# Patient Record
Sex: MALE | Race: WHITE | NOT HISPANIC OR LATINO | URBAN - NONMETROPOLITAN AREA
[De-identification: names, ages, dates, MRNs, and addresses within clinical notes are randomized per-mention and may not be internally consistent; named-entity substitution may affect disease eponyms.]

---

## 2020-09-15 ENCOUNTER — OFFICE VISIT (OUTPATIENT)
Dept: URBAN - NONMETROPOLITAN AREA CLINIC 2 | Facility: CLINIC | Age: 70
End: 2020-09-15
Payer: COMMERCIAL

## 2020-09-15 ENCOUNTER — TELEPHONE ENCOUNTER (OUTPATIENT)
Dept: URBAN - NONMETROPOLITAN AREA CLINIC 2 | Facility: CLINIC | Age: 70
End: 2020-09-15

## 2020-09-15 DIAGNOSIS — D50.9 IRON DEFICIENCY ANEMIA: ICD-10-CM

## 2020-09-15 DIAGNOSIS — I48.91 ATRIAL FIBRILLATION: ICD-10-CM

## 2020-09-15 DIAGNOSIS — K63.5 COLONIC POLYP: ICD-10-CM

## 2020-09-15 DIAGNOSIS — I25.9 CORONARY ARTERY DISEASE: ICD-10-CM

## 2020-09-15 DIAGNOSIS — K57.90 DIVERTICULOSIS: ICD-10-CM

## 2020-09-15 PROCEDURE — 3017F COLORECTAL CA SCREEN DOC REV: CPT | Performed by: INTERNAL MEDICINE

## 2020-09-15 PROCEDURE — 99204 OFFICE O/P NEW MOD 45 MIN: CPT | Performed by: INTERNAL MEDICINE

## 2020-09-15 PROCEDURE — G8427 DOCREV CUR MEDS BY ELIG CLIN: HCPCS | Performed by: INTERNAL MEDICINE

## 2020-09-15 PROCEDURE — 1036F TOBACCO NON-USER: CPT | Performed by: INTERNAL MEDICINE

## 2020-09-15 NOTE — HPI-TODAY'S VISIT:
Mr. Lawson is a 70 year old gentleman with past medical history of CAD s/p stents to proximal and mid LAD (1998), mid-distal LAD (2003), apical thrombus currently on coumadin, paroxysmal atrial fibrillation on coumadin, history of AICD, heart failure with EF of 50-55%, history of colonic polyps, and history of iron deficiency anemia who presents for evaluation of iron deficiency anemia.  Mr. Lawson states he has had a two year history of iron deficiency anemia. He was previously evaluated Dr. Delgado regarding his iron deficiency anemia. As part of the evaluation, Mr. Lawson underwent a colonoscopy which was reportedly normal in 2019. At the time there was discussion about undergoing an upper endoscopy and video capsule endoscopy, however Mr. Lawson did not pursue the upper endoscopy or video capsule endoscopy.   He notes that he has been on iron supplementation. He is currently on coumadin. He is not on non-steroidal anti-inflammatory medications or aspirin. He is not currently on a proton pump inhibitor.   He notes his stool is dark brown. He denies melena or hematochezia. He denies weight loss. He denies reflux ilke symptoms.   He is currently being evaluated at [facility] for his iron deficiency anemia.   Prior Endoscopic Evaluation: 2019: Colonoscopy Sigmoid diverticulosis. No polyps identified.   7/28/2020:  Hemoglobin 11.2, hematocrit 34.0.

## 2020-09-16 LAB
A/G RATIO: 1.4
ALBUMIN: 4.2
ALKALINE PHOSPHATASE: 90
ALT (SGPT): 19
AST (SGOT): 21
BASO (ABSOLUTE): 0.1
BASOS: 1
BILIRUBIN, TOTAL: 0.4
BUN/CREATININE RATIO: 10
BUN: 11
CALCIUM: 8.9
CARBON DIOXIDE, TOTAL: 25
CHLORIDE: 98
CREATININE: 1.1
EGFR IF AFRICN AM: 78
EGFR IF NONAFRICN AM: 67
EOS (ABSOLUTE): 0.2
EOS: 3
FERRITIN, SERUM: 35
GLOBULIN, TOTAL: 3
GLUCOSE: 88
HEMATOCRIT: 35.8
HEMATOLOGY COMMENTS:: (no result)
HEMOGLOBIN: 11.5
IMMATURE CELLS: (no result)
IMMATURE GRANS (ABS): 0
IMMATURE GRANULOCYTES: 1
IRON BIND.CAP.(TIBC): 359
IRON SATURATION: 14
IRON: 49
LYMPHS (ABSOLUTE): 1.5
LYMPHS: 25
MCH: 28.5
MCHC: 32.1
MCV: 89
MONOCYTES(ABSOLUTE): 0.6
MONOCYTES: 10
NEUTROPHILS (ABSOLUTE): 3.6
NEUTROPHILS: 60
NRBC: (no result)
PLATELETS: 258
POTASSIUM: 4.4
PROTEIN, TOTAL: 7.2
RBC: 4.03
RDW: 13.7
SODIUM: 137
UIBC: 310
WBC: 6

## 2020-10-06 ENCOUNTER — TELEPHONE ENCOUNTER (OUTPATIENT)
Dept: URBAN - METROPOLITAN AREA CLINIC 92 | Facility: CLINIC | Age: 70
End: 2020-10-06

## 2020-10-13 ENCOUNTER — OFFICE VISIT (OUTPATIENT)
Dept: URBAN - NONMETROPOLITAN AREA CLINIC 2 | Facility: CLINIC | Age: 70
End: 2020-10-13
Payer: COMMERCIAL

## 2020-10-13 ENCOUNTER — WEB ENCOUNTER (OUTPATIENT)
Dept: URBAN - NONMETROPOLITAN AREA CLINIC 2 | Facility: CLINIC | Age: 70
End: 2020-10-13

## 2020-10-13 DIAGNOSIS — D50.9 IRON DEFICIENCY ANEMIA: ICD-10-CM

## 2020-10-13 DIAGNOSIS — K63.5 COLONIC POLYP: ICD-10-CM

## 2020-10-13 DIAGNOSIS — I25.9 CORONARY ARTERY DISEASE: ICD-10-CM

## 2020-10-13 DIAGNOSIS — I48.91 ATRIAL FIBRILLATION: ICD-10-CM

## 2020-10-13 DIAGNOSIS — K57.90 DIVERTICULOSIS: ICD-10-CM

## 2020-10-13 PROCEDURE — 3017F COLORECTAL CA SCREEN DOC REV: CPT | Performed by: INTERNAL MEDICINE

## 2020-10-13 PROCEDURE — 99214 OFFICE O/P EST MOD 30 MIN: CPT | Performed by: INTERNAL MEDICINE

## 2020-10-13 PROCEDURE — G8427 DOCREV CUR MEDS BY ELIG CLIN: HCPCS | Performed by: INTERNAL MEDICINE

## 2020-10-13 PROCEDURE — 1036F TOBACCO NON-USER: CPT | Performed by: INTERNAL MEDICINE

## 2020-10-13 NOTE — HPI-TODAY'S VISIT:
9/15/2020: Initial Gastroenterology Clinic Visit  Mr. Lawson is a 70 year old gentleman with past medical history of CAD s/p stents to proximal and mid LAD (1998), mid-distal LAD (2003), apical thrombus currently on coumadin, paroxysmal atrial fibrillation on coumadin, history of AICD, heart failure with EF of 50-55%, history of colonic polyps, and history of iron deficiency anemia who presents for evaluation of iron deficiency anemia.  Mr. Lawson states he has had a two year history of iron deficiency anemia. He was previously evaluated by Dr. Delgado regarding his iron deficiency anemia. As part of the evaluation, Mr. Lawson underwent a colonoscopy which showed sigmoid diverticulosis. At the time there was discussion about undergoing an upper endoscopy and video capsule endoscopy, however Mr. Lawson did not pursue the upper endoscopy or video capsule endoscopy.   He is currently on coumadin. He is not on non-steroidal anti-inflammatory medications or aspirin. He is not currently on a proton pump inhibitor.   He notes his stool is dark brown. He denies melena or hematochezia. He denies weight loss. He denies reflux ilke symptoms.   He is currently being evaluated at [facility] for his iron deficiency anemia.   Prior Endoscopic Evaluation: 2019: Colonoscopy Sigmoid diverticulosis. No polyps identified.   Prior Laboratory Evaluation:  7/28/2020:  Hemoglobin 11.2, hematocrit 34.0.  Interval Events:  - 9/15/2020: WBC normal, hemoglobin 11.5, hematocrit 35.8, platelets 258. Serum iron 49, TIBC 359, percent transferrin 14%. Chemistry panel and LFTs normal. - After results were relayed to Mr. Lawson, he indicated he wanted to check his repeat blood counts at his next clinic visit before deciding on undergoingan EGD + VCE. - He denies melena or hematochezia. He is not on iron supplementation currently. Denies abdominal pain.

## 2020-10-14 LAB
BASO (ABSOLUTE): 0.1
BASOS: 1
EOS (ABSOLUTE): 0.1
EOS: 2
FERRITIN, SERUM: 23
HEMATOCRIT: 39.2
HEMATOLOGY COMMENTS:: (no result)
HEMOGLOBIN: 12.3
IMMATURE CELLS: (no result)
IMMATURE GRANS (ABS): 0
IMMATURE GRANULOCYTES: 0
IRON BIND.CAP.(TIBC): 359
IRON SATURATION: 12
IRON: 44
LYMPHS (ABSOLUTE): 1.2
LYMPHS: 20
MCH: 27.9
MCHC: 31.4
MCV: 89
MONOCYTES(ABSOLUTE): 0.7
MONOCYTES: 10
NEUTROPHILS (ABSOLUTE): 4.2
NEUTROPHILS: 67
NRBC: (no result)
PLATELETS: 224
RBC: 4.41
RDW: 14.2
UIBC: 315
WBC: 6.3

## 2020-11-12 ENCOUNTER — TELEPHONE ENCOUNTER (OUTPATIENT)
Dept: URBAN - NONMETROPOLITAN AREA CLINIC 2 | Facility: CLINIC | Age: 70
End: 2020-11-12

## 2020-11-24 LAB
BASO (ABSOLUTE): 0.1
BASOS: 1
EOS (ABSOLUTE): 0.1
EOS: 3
FERRITIN, SERUM: 37
HEMATOCRIT: 42.4
HEMATOLOGY COMMENTS:: (no result)
HEMOGLOBIN: 13.6
IMMATURE CELLS: (no result)
IMMATURE GRANS (ABS): 0
IMMATURE GRANULOCYTES: 0
IRON BIND.CAP.(TIBC): 360
IRON SATURATION: 17
IRON: 62
LYMPHS (ABSOLUTE): 1
LYMPHS: 21
MCH: 29.2
MCHC: 32.1
MCV: 91
MONOCYTES(ABSOLUTE): 0.4
MONOCYTES: 8
NEUTROPHILS (ABSOLUTE): 3.1
NEUTROPHILS: 67
NRBC: (no result)
PLATELETS: 194
RBC: 4.66
RDW: 16.2
UIBC: 298
WBC: 4.6

## 2020-12-01 ENCOUNTER — TELEPHONE ENCOUNTER (OUTPATIENT)
Dept: URBAN - METROPOLITAN AREA CLINIC 92 | Facility: CLINIC | Age: 70
End: 2020-12-01

## 2020-12-07 ENCOUNTER — TELEPHONE ENCOUNTER (OUTPATIENT)
Dept: URBAN - METROPOLITAN AREA CLINIC 92 | Facility: CLINIC | Age: 70
End: 2020-12-07

## 2020-12-08 ENCOUNTER — OFFICE VISIT (OUTPATIENT)
Dept: URBAN - NONMETROPOLITAN AREA CLINIC 2 | Facility: CLINIC | Age: 70
End: 2020-12-08

## 2021-01-11 ENCOUNTER — OFFICE VISIT (OUTPATIENT)
Dept: URBAN - NONMETROPOLITAN AREA CLINIC 2 | Facility: CLINIC | Age: 71
End: 2021-01-11

## 2021-02-01 ENCOUNTER — OFFICE VISIT (OUTPATIENT)
Dept: URBAN - NONMETROPOLITAN AREA CLINIC 2 | Facility: CLINIC | Age: 71
End: 2021-02-01
Payer: COMMERCIAL

## 2021-02-01 VITALS
HEART RATE: 93 BPM | SYSTOLIC BLOOD PRESSURE: 101 MMHG | BODY MASS INDEX: 30.8 KG/M2 | WEIGHT: 220 LBS | HEIGHT: 71 IN | DIASTOLIC BLOOD PRESSURE: 69 MMHG | TEMPERATURE: 96.7 F

## 2021-02-01 DIAGNOSIS — K57.30 COLON, DIVERTICULOSIS: ICD-10-CM

## 2021-02-01 DIAGNOSIS — I48.91 ATRIAL FIBRILLATION: ICD-10-CM

## 2021-02-01 DIAGNOSIS — D50.8 OTHER IRON DEFICIENCY ANEMIAS: ICD-10-CM

## 2021-02-01 DIAGNOSIS — K63.5 COLONIC POLYP: ICD-10-CM

## 2021-02-01 DIAGNOSIS — I25.9 CORONARY ARTERY DISEASE: ICD-10-CM

## 2021-02-01 PROCEDURE — 1036F TOBACCO NON-USER: CPT | Performed by: INTERNAL MEDICINE

## 2021-02-01 PROCEDURE — G9903 PT SCRN TBCO ID AS NON USER: HCPCS | Performed by: INTERNAL MEDICINE

## 2021-02-01 PROCEDURE — 99213 OFFICE O/P EST LOW 20 MIN: CPT | Performed by: INTERNAL MEDICINE

## 2021-02-01 PROCEDURE — G8427 DOCREV CUR MEDS BY ELIG CLIN: HCPCS | Performed by: INTERNAL MEDICINE

## 2021-02-01 PROCEDURE — 3017F COLORECTAL CA SCREEN DOC REV: CPT | Performed by: INTERNAL MEDICINE

## 2021-02-01 NOTE — HPI-TODAY'S VISIT:
9/15/2020: Initial Gastroenterology Clinic Visit    Mr. Lawson is a 70 year old gentleman with past medical history of CAD s/p stents to proximal and mid LAD (1998), mid-distal LAD (2003), apical thrombus currently on coumadin, paroxysmal atrial fibrillation on coumadin, history of AICD, heart failure with EF of 50-55%, history of colonic polyps, and history of iron deficiency anemia who presents for evaluation of iron deficiency anemia.  Mr. Lawson states he has had a two year history of iron deficiency anemia. He was previously evaluated by Dr. Delgado regarding his iron deficiency anemia. As part of the evaluation, Mr. Lawson underwent a colonoscopy which showed sigmoid diverticulosis. At the time there was discussion about undergoing an upper endoscopy and video capsule endoscopy, however Mr. Lawson did not pursue the upper endoscopy or video capsule endoscopy.   He is currently on coumadin. He is not on non-steroidal anti-inflammatory medications or aspirin. He is not currently on a proton pump inhibitor.   He notes his stool is dark brown. He denies melena or hematochezia. He denies weight loss. He denies reflux ilke symptoms.   He is currently being evaluated at [facility] for his iron deficiency anemia.   Prior Endoscopic Evaluation: 2019: Colonoscopy Sigmoid diverticulosis. No polyps identified.   Prior Laboratory Evaluation:  7/28/2020:  Hemoglobin 11.2, hematocrit 34.0.  Interval Events:  - 9/15/2020: WBC normal, hemoglobin 11.5, hematocrit 35.8, platelets 258. Serum iron 49, TIBC 359, percent transferrin 14%. Chemistry panel and LFTs normal. - After results were relayed to Mr. Lawson, he indicated he wanted to check his repeat blood counts at his next clinic visit before deciding on undergoingan EGD + VCE.  10/13/2020: WBC 6.3 (differential normal), hemoglobin 12.3, hematocrit 39.2, platelets 224. Serum iron 44, TIBC 359, percent saturation 12%.  11/12/2020: Complete blood count with differential normal. Serum iron 62, TIBC 360, percent saturation 17%.  2/1/2021: Gastroenterology Clinic Follow-Up He is not on iron supplementation currently. Denies abdominal pain. He is interested in pursuing upper endoscopy.

## 2021-02-23 ENCOUNTER — TELEPHONE ENCOUNTER (OUTPATIENT)
Dept: URBAN - METROPOLITAN AREA CLINIC 92 | Facility: CLINIC | Age: 71
End: 2021-02-23

## 2021-03-16 ENCOUNTER — OFFICE VISIT (OUTPATIENT)
Dept: URBAN - METROPOLITAN AREA MEDICAL CENTER 1 | Facility: MEDICAL CENTER | Age: 71
End: 2021-03-16
Payer: COMMERCIAL

## 2021-03-16 DIAGNOSIS — K29.40 ATROPHIC GASTRITIS: ICD-10-CM

## 2021-03-16 DIAGNOSIS — K22.8 COLUMNAR-LINED ESOPHAGUS: ICD-10-CM

## 2021-03-16 PROCEDURE — 43239 EGD BIOPSY SINGLE/MULTIPLE: CPT | Performed by: INTERNAL MEDICINE

## 2021-03-18 ENCOUNTER — TELEPHONE ENCOUNTER (OUTPATIENT)
Dept: URBAN - NONMETROPOLITAN AREA CLINIC 2 | Facility: CLINIC | Age: 71
End: 2021-03-18

## 2021-03-18 RX ORDER — PANTOPRAZOLE SODIUM 40 MG/1
1 TABLET TABLET, DELAYED RELEASE ORAL EVERY 12 HOURS
Qty: 180 TABLET | Refills: 3 | OUTPATIENT
Start: 2021-03-19

## 2021-03-24 ENCOUNTER — TELEPHONE ENCOUNTER (OUTPATIENT)
Dept: URBAN - NONMETROPOLITAN AREA CLINIC 2 | Facility: CLINIC | Age: 71
End: 2021-03-24

## 2021-03-25 ENCOUNTER — TELEPHONE ENCOUNTER (OUTPATIENT)
Dept: URBAN - NONMETROPOLITAN AREA CLINIC 2 | Facility: CLINIC | Age: 71
End: 2021-03-25

## 2021-03-30 ENCOUNTER — OFFICE VISIT (OUTPATIENT)
Dept: URBAN - NONMETROPOLITAN AREA CLINIC 2 | Facility: CLINIC | Age: 71
End: 2021-03-30

## 2021-04-07 ENCOUNTER — TELEPHONE ENCOUNTER (OUTPATIENT)
Dept: URBAN - NONMETROPOLITAN AREA CLINIC 2 | Facility: CLINIC | Age: 71
End: 2021-04-07

## 2021-05-18 ENCOUNTER — TELEPHONE ENCOUNTER (OUTPATIENT)
Dept: URBAN - NONMETROPOLITAN AREA CLINIC 2 | Facility: CLINIC | Age: 71
End: 2021-05-18

## 2021-05-18 ENCOUNTER — OFFICE VISIT (OUTPATIENT)
Dept: URBAN - METROPOLITAN AREA MEDICAL CENTER 1 | Facility: MEDICAL CENTER | Age: 71
End: 2021-05-18
Payer: COMMERCIAL

## 2021-05-18 DIAGNOSIS — K29.40 ATROPHIC GASTRITIS: ICD-10-CM

## 2021-05-18 DIAGNOSIS — K22.8 COLUMNAR-LINED ESOPHAGUS: ICD-10-CM

## 2021-05-18 PROCEDURE — 43239 EGD BIOPSY SINGLE/MULTIPLE: CPT | Performed by: INTERNAL MEDICINE

## 2021-05-18 RX ORDER — PANTOPRAZOLE SODIUM 40 MG/1
1 TABLET TABLET, DELAYED RELEASE ORAL EVERY 12 HOURS
Qty: 180 TABLET | Refills: 3 | Status: ACTIVE | COMMUNITY
Start: 2021-03-19

## 2021-06-11 ENCOUNTER — TELEPHONE ENCOUNTER (OUTPATIENT)
Dept: URBAN - NONMETROPOLITAN AREA CLINIC 2 | Facility: CLINIC | Age: 71
End: 2021-06-11

## 2021-06-14 PROBLEM — 55300003: Status: ACTIVE | Noted: 2021-06-14

## 2021-06-17 LAB
A/G RATIO: 1.4
ALBUMIN: 4.3
ALKALINE PHOSPHATASE: 80
ALT (SGPT): 29
AST (SGOT): 23
BASO (ABSOLUTE): 0.1
BASOS: 1
BILIRUBIN, TOTAL: 0.5
BUN/CREATININE RATIO: 16
BUN: 20
CALCIUM: 9.2
CARBON DIOXIDE, TOTAL: 27
CHLORIDE: 100
CREATINE KINASE,TOTAL: 144
CREATININE: 1.23
EGFR IF AFRICN AM: 68
EGFR IF NONAFRICN AM: 59
EOS (ABSOLUTE): 0.2
EOS: 3
GLOBULIN, TOTAL: 3
GLUCOSE: 94
HEMATOCRIT: 43.7
HEMATOLOGY COMMENTS:: (no result)
HEMOGLOBIN: 14.8
IMMATURE CELLS: (no result)
IMMATURE GRANS (ABS): 0
IMMATURE GRANULOCYTES: 1
LYMPHS (ABSOLUTE): 1.2
LYMPHS: 20
MCH: 31.6
MCHC: 33.9
MCV: 93
MONOCYTES(ABSOLUTE): 0.7
MONOCYTES: 12
NEUTROPHILS (ABSOLUTE): 3.7
NEUTROPHILS: 63
NRBC: (no result)
PLATELETS: 192
POTASSIUM: 4.4
PROTEIN, TOTAL: 7.3
RBC: 4.68
RDW: 13.5
SODIUM: 138
WBC: 5.9

## 2021-06-21 ENCOUNTER — OFFICE VISIT (OUTPATIENT)
Dept: URBAN - NONMETROPOLITAN AREA CLINIC 2 | Facility: CLINIC | Age: 71
End: 2021-06-21
Payer: COMMERCIAL

## 2021-06-21 VITALS — HEART RATE: 77 BPM | HEIGHT: 71 IN | DIASTOLIC BLOOD PRESSURE: 79 MMHG | SYSTOLIC BLOOD PRESSURE: 119 MMHG

## 2021-06-21 DIAGNOSIS — Z86.010 PERSONAL HISTORY OF COLONIC POLYPS: ICD-10-CM

## 2021-06-21 DIAGNOSIS — D51.0 PERNICIOUS ANEMIA: ICD-10-CM

## 2021-06-21 DIAGNOSIS — I48.91 ATRIAL FIBRILLATION: ICD-10-CM

## 2021-06-21 DIAGNOSIS — D50.9 IRON DEFICIENCY ANEMIA: ICD-10-CM

## 2021-06-21 DIAGNOSIS — K31.89 INTESTINAL METAPLASIA OF GASTRIC MUCOSA: ICD-10-CM

## 2021-06-21 DIAGNOSIS — K22.70 BARRETT'S ESOPHAGUS WITHOUT DYSPLASIA: ICD-10-CM

## 2021-06-21 DIAGNOSIS — I25.9 CORONARY ARTERY DISEASE: ICD-10-CM

## 2021-06-21 DIAGNOSIS — K57.90 DIVERTICULOSIS: ICD-10-CM

## 2021-06-21 PROCEDURE — 99214 OFFICE O/P EST MOD 30 MIN: CPT | Performed by: INTERNAL MEDICINE

## 2021-06-21 RX ORDER — PANTOPRAZOLE SODIUM 40 MG/1
1 TABLET TABLET, DELAYED RELEASE ORAL EVERY 12 HOURS
Qty: 180 TABLET | Refills: 3 | Status: ACTIVE | COMMUNITY
Start: 2021-03-19

## 2021-06-21 NOTE — HPI-TODAY'S VISIT:
9/15/2020: Initial Gastroenterology Clinic Visit       Mr. Lawson is a 70 year old gentleman with past medical history of CAD s/p stents to proximal and mid LAD (1998), mid-distal LAD (2003), apical thrombus currently on coumadin, paroxysmal atrial fibrillation on coumadin, history of AICD, heart failure with EF of 50-55%, history of colonic polyps, and history of iron deficiency anemia who presents for evaluation of iron deficiency anemia.  Mr. Lawson states he has had a two year history of iron deficiency anemia. He was previously evaluated by Dr. Delgado regarding his iron deficiency anemia. As part of the evaluation, Mr. Lawson underwent a colonoscopy which showed sigmoid diverticulosis. At the time there was discussion about undergoing an upper endoscopy and video capsule endoscopy, however Mr. Lawson did not pursue the upper endoscopy or video capsule endoscopy.   He is currently on coumadin. He is not on non-steroidal anti-inflammatory medications or aspirin. He is not currently on a proton pump inhibitor.   He notes his stool is dark brown. He denies melena or hematochezia. He denies weight loss. He denies reflux like symptoms.   He is currently being evaluated at [facility] for his iron deficiency anemia.   Prior Endoscopic Evaluation: 2019: Colonoscopy Sigmoid diverticulosis. No polyps identified.   Prior Laboratory Evaluation:  7/28/2020:  Hemoglobin 11.2, hematocrit 34.0.  Interval Events:  - 9/15/2020: WBC normal, hemoglobin 11.5, hematocrit 35.8, platelets 258. Serum iron 49, TIBC 359, percent transferrin 14%. Chemistry panel and LFTs normal. - After results were relayed to Mr. Lawson, he indicated he wanted to check his repeat blood counts at his next clinic visit before deciding on undergoingan EGD + VCE.  10/13/2020: WBC 6.3 (differential normal), hemoglobin 12.3, hematocrit 39.2, platelets 224. Serum iron 44, TIBC 359, percent saturation 12%.  11/12/2020: Complete blood count with differential normal. Serum iron 62, TIBC 360, percent saturation 17%.  2/1/2021: Gastroenterology Clinic Follow-Up He is not on iron supplementation currently. Denies abdominal pain. He is interested in pursuing upper endoscopy.  3/16/2021: EGD FINDINGS / IMPRESSION: - The examined esophagus was normal. - There were three tongues of salmon colored mucosa at the gastroesophageal junction. The maximum longitudinal extent was 1 cm. Biopsies obtained with cold biopsy forceps to evaluate for Medina's esophagus. Estimated blood loss was minimal.  - The Z-line was present at 38 cm. - Small hiatal hernia. - There was erythematous mucosa at the gastric antrum with small superficial erosions. Biopsies obtained with cold biopsy forceps to rule out Helicobacter pylori. Estimated blood loss was minimal. - The cardia and fundus were normal on retroflexion. - The duodenum was normal. Biopsies obtained with cold biopsy forceps to rule out Celiac disease. Estimated blood loss was minimal.  3/16/2021: Pathology from EGD Final Diagnosis A.  SMALL INTESTINE, DUODENUM, BIOPSY:              -NO SIGNIFICANT HISTOPATHOLOGIC CHANGE. B.  STOMACH, ANTRUM, BIOPSY:              -MODERATE CHRONIC GASTRITIS WITH REACTIVE/REGENERATIVE EPITHELIAL CHANGES.              -NO HELICOBACTER PYLORI ORGANISMS IDENTIFIED..              -INTESTINAL METAPLASIA PRESENT.              -NO DYSPLASIA OR CARCINOMA IDENTIFIED. C.  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              -NO DYSPLASIA OR CARCINOMA IDENTIFIED.  5/18/2021: EGD There were three tongues of salmon colored esophageal mucosa suspicious for short segments Barretts esophagus at the gastroesophageal junction. The maximum longitudinal extent of these mucosal changes was 1 cm in length.  A small hiatal hernia was present.  Diffuse erythematous mucosa without bleeding was found in the entire examined stomach. Given history of intestinal metaplasia in the antrum during EGD 3/2021, biopsies were taken with a cold forceps for histology of the antrum, incisura, body, and cardia for gastric mapping.  The cardia and gastric fundus were normal on retroflexion. The examined duodenum was normal.   5/18/2021: Pathology from EGD A.  STOMACH, ANTRUM, BIOPSY:  -MODERATE CHRONIC GASTRITIS WITH REACTIVE EPITHELIAL CHANGES -NO HELICOBACTER PYLORI ORGANISMS IDENTIFIED. -NO INTESTINAL METAPLASIA, DYSPLASIA, OR CARCINOMA IDENTIFIED. B.  STOMACH, FUNDUS, BIOPSY:  -MODERATE CHRONIC GASTRITIS WITH REACTIVE EPITHELIAL CHANGES -NO HELICOBACTER PYLORI ORGANISMS IDENTIFIED.. -NO INTESTINAL METAPLASIA, DYSPLASIA, OR CARCINOMA IDENTIFIED. C.  STOMACH, CARDIA, BIOPSY: -MODERATE CHRONIC GASTRITIS, FOCAL ACUTE INFLAMMATION AND REACTIVE EPITHELIAL CHANGES -NO HELICOBACTER PYLORI ORGANISMS IDENTIFIED. -INTESTINAL METAPLASIA PRESENT. -NO DYSPLASIA OR CARCINOMA IDENTIFIED. D.  STOMACH, ANTRUM, BIOPSY:  -MODERATE CHRONIC GASTRITIS, FOCAL ACUTE INFLAMMATION AND REACTIVE EPITHELIAL CHANGES -NO HELICOBACTER PYLORI ORGANISMS IDENTIFIED. -INTESTINAL METAPLASIA PRESENT. -NO DYSPLASIA OR CARCINOMA IDENTIFIED. E.  ESOPHAGUS, BIOPSY -SQUAMOCOLUMNAR MUCOSA WITH MILD INCREASE IN CHRONIC INFLAMMATION. -NO GOBLET CELL METAPLASIA IDENTIFIED. 6/11/2021: CBC, chemistry panel, LFTs, CK normal.   6/21/2021: Gastroenterology Follow-Up Visit Mr. Lawson has continued his pantoprazole BID. He has been tolerating the pantoprazole without difficulty. Denies dysphagia, odynophagia, abdominal symptoms.

## 2021-06-23 LAB
ANTIPARIETAL CELL ANTIBODY: 26.6
H PYLORI, IGM ABS: <9
H. PYLORI, IGA ABS: <9
H. PYLORI, IGG ABS: 0.65
INTRINSIC FACTOR ABS, SERUM: 10.5

## 2021-07-08 ENCOUNTER — TELEPHONE ENCOUNTER (OUTPATIENT)
Dept: URBAN - NONMETROPOLITAN AREA CLINIC 2 | Facility: CLINIC | Age: 71
End: 2021-07-08

## 2021-07-12 ENCOUNTER — OFFICE VISIT (OUTPATIENT)
Dept: URBAN - NONMETROPOLITAN AREA CLINIC 2 | Facility: CLINIC | Age: 71
End: 2021-07-12
Payer: COMMERCIAL

## 2021-07-12 VITALS — HEART RATE: 79 BPM | SYSTOLIC BLOOD PRESSURE: 107 MMHG | DIASTOLIC BLOOD PRESSURE: 72 MMHG | HEIGHT: 71 IN

## 2021-07-12 DIAGNOSIS — K31.89 INTESTINAL METAPLASIA OF GASTRIC MUCOSA: ICD-10-CM

## 2021-07-12 DIAGNOSIS — D51.0 PERNICIOUS ANEMIA: ICD-10-CM

## 2021-07-12 DIAGNOSIS — Z86.010 PERSONAL HISTORY OF COLONIC POLYPS: ICD-10-CM

## 2021-07-12 DIAGNOSIS — K22.70 BARRETT'S ESOPHAGUS WITHOUT DYSPLASIA: ICD-10-CM

## 2021-07-12 DIAGNOSIS — I48.91 ATRIAL FIBRILLATION: ICD-10-CM

## 2021-07-12 DIAGNOSIS — K92.1 HEMATOCHEZIA: ICD-10-CM

## 2021-07-12 DIAGNOSIS — I25.9 CORONARY ARTERY DISEASE: ICD-10-CM

## 2021-07-12 DIAGNOSIS — D50.9 IRON DEFICIENCY ANEMIA: ICD-10-CM

## 2021-07-12 DIAGNOSIS — K57.90 DIVERTICULOSIS: ICD-10-CM

## 2021-07-12 PROCEDURE — 99214 OFFICE O/P EST MOD 30 MIN: CPT | Performed by: INTERNAL MEDICINE

## 2021-07-12 RX ORDER — POLYETHYLENE GLYCOL 3350, SODIUM SULFATE, SODIUM CHLORIDE, POTASSIUM CHLORIDE, ASCORBIC ACID, SODIUM ASCORBATE 140-9-5.2G
AS DIRECTED KIT ORAL AS DIRECTED
Qty: 280 GRAM | Refills: 0 | OUTPATIENT
Start: 2021-07-12 | End: 2021-07-13

## 2021-07-12 RX ORDER — PANTOPRAZOLE SODIUM 40 MG/1
1 TABLET TABLET, DELAYED RELEASE ORAL EVERY 12 HOURS
Qty: 180 TABLET | Refills: 3 | Status: ACTIVE | COMMUNITY
Start: 2021-03-19

## 2021-07-12 NOTE — PHYSICAL EXAM GASTROINTESTINAL
Abdomen , soft, nontender, nondistended , no guarding or rigidity , no masses palpable , normal bowel sounds , Liver and Spleen , no hepatomegaly present , no hepatosplenomegaly , liver nontender , spleen not palpable , Rectal , a chaperon was offered to Mr. Lawson for rectal examination which Mr. Lawson respectfully declined, external hemorrhoids appreciated, brown stool in the rectal vault, normal sphincter tone , no rectal masses, no bleeding

## 2021-07-12 NOTE — HPI-TODAY'S VISIT:
9/15/2020: Initial Gastroenterology Clinic Visit       Mr. Lawson is a 70 year old gentleman with past medical history of CAD s/p stents to proximal and mid LAD (1998), mid-distal LAD (2003), apical thrombus currently on coumadin, paroxysmal atrial fibrillation on coumadin, history of AICD, heart failure with EF of 50-55%, history of colonic polyps, and history of iron deficiency anemia who presents for evaluation of iron deficiency anemia.  Mr. Lawson states he has had a two year history of iron deficiency anemia. He was previously evaluated by Dr. Delgado regarding his iron deficiency anemia. As part of the evaluation, Mr. Lawson underwent a colonoscopy which showed sigmoid diverticulosis. At the time there was discussion about undergoing an upper endoscopy and video capsule endoscopy, however Mr. Lawson did not pursue the upper endoscopy or video capsule endoscopy.   He is currently on coumadin. He is not on non-steroidal anti-inflammatory medications or aspirin. He is not currently on a proton pump inhibitor.   He notes his stool is dark brown. He denies melena or hematochezia. He denies weight loss. He denies reflux like symptoms.   He is currently being evaluated at [facility] [place] for his iron deficiency anemia.   Prior Endoscopic Evaluation: 2019: Colonoscopy Sigmoid diverticulosis. No polyps identified.   Prior Laboratory Evaluation:  7/28/2020:  Hemoglobin 11.2, hematocrit 34.0.  Interval Events:  - 9/15/2020: WBC normal, hemoglobin 11.5, hematocrit 35.8, platelets 258. Serum iron 49, TIBC 359, percent transferrin 14%. Chemistry panel and LFTs normal. - After results were relayed to Mr. Lawson, he indicated he wanted to check his repeat blood counts at his next clinic visit before deciding on undergoingan EGD + VCE.  10/13/2020: WBC 6.3 (differential normal), hemoglobin 12.3, hematocrit 39.2, platelets 224. Serum iron 44, TIBC 359, percent saturation 12%.  11/12/2020: Complete blood count with differential normal. Serum iron 62, TIBC 360, percent saturation 17%.  2/1/2021: Gastroenterology Clinic Follow-Up He is not on iron supplementation currently. Denies abdominal pain. He is interested in pursuing upper endoscopy.  3/16/2021: EGD FINDINGS / IMPRESSION: - The examined esophagus was normal. - There were three tongues of salmon colored mucosa at the gastroesophageal junction. The maximum longitudinal extent was 1 cm. Biopsies obtained with cold biopsy forceps to evaluate for Medina's esophagus. Estimated blood loss was minimal.  - The Z-line was present at 38 cm. - Small hiatal hernia. - There was erythematous mucosa at the gastric antrum with small superficial erosions. Biopsies obtained with cold biopsy forceps to rule out Helicobacter pylori. Estimated blood loss was minimal. - The cardia and fundus were normal on retroflexion. - The duodenum was normal. Biopsies obtained with cold biopsy forceps to rule out Celiac disease. Estimated blood loss was minimal.  3/16/2021: Pathology from EGD Final Diagnosis A.  SMALL INTESTINE, DUODENUM, BIOPSY:              -NO SIGNIFICANT HISTOPATHOLOGIC CHANGE. B.  STOMACH, ANTRUM, BIOPSY:              -MODERATE CHRONIC GASTRITIS WITH REACTIVE/REGENERATIVE EPITHELIAL CHANGES.              -NO HELICOBACTER PYLORI ORGANISMS IDENTIFIED..              -INTESTINAL METAPLASIA PRESENT.              -NO DYSPLASIA OR CARCINOMA IDENTIFIED. C.  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              -NO DYSPLASIA OR CARCINOMA IDENTIFIED.  5/18/2021: EGD There were three tongues of salmon colored esophageal mucosa suspicious for short segments Barretts esophagus at the gastroesophageal junction. The maximum longitudinal extent of these mucosal changes was 1 cm in length.  A small hiatal hernia was present.  Diffuse erythematous mucosa without bleeding was found in the entire examined stomach. Given history of intestinal metaplasia in the antrum during EGD 3/2021, biopsies were taken with a cold forceps for histology of the antrum, incisura, body, and cardia for gastric mapping.  The cardia and gastric fundus were normal on retroflexion. The examined duodenum was normal.   5/18/2021: Pathology from EGD A.  STOMACH, ANTRUM, BIOPSY:  -MODERATE CHRONIC GASTRITIS WITH REACTIVE EPITHELIAL CHANGES -NO HELICOBACTER PYLORI ORGANISMS IDENTIFIED. -NO INTESTINAL METAPLASIA, DYSPLASIA, OR CARCINOMA IDENTIFIED. B.  STOMACH, FUNDUS, BIOPSY:  -MODERATE CHRONIC GASTRITIS WITH REACTIVE EPITHELIAL CHANGES -NO HELICOBACTER PYLORI ORGANISMS IDENTIFIED.. -NO INTESTINAL METAPLASIA, DYSPLASIA, OR CARCINOMA IDENTIFIED. C.  STOMACH, CARDIA, BIOPSY: -MODERATE CHRONIC GASTRITIS, FOCAL ACUTE INFLAMMATION AND REACTIVE EPITHELIAL CHANGES -NO HELICOBACTER PYLORI ORGANISMS IDENTIFIED. -INTESTINAL METAPLASIA PRESENT. -NO DYSPLASIA OR CARCINOMA IDENTIFIED. D.  STOMACH, ANTRUM, BIOPSY:  -MODERATE CHRONIC GASTRITIS, FOCAL ACUTE INFLAMMATION AND REACTIVE EPITHELIAL CHANGES -NO HELICOBACTER PYLORI ORGANISMS IDENTIFIED. -INTESTINAL METAPLASIA PRESENT. -NO DYSPLASIA OR CARCINOMA IDENTIFIED. E.  ESOPHAGUS, BIOPSY -SQUAMOCOLUMNAR MUCOSA WITH MILD INCREASE IN CHRONIC INFLAMMATION. -NO GOBLET CELL METAPLASIA IDENTIFIED. 6/11/2021: CBC, chemistry panel, LFTs, CK normal.   6/21/2021: Gastroenterology Follow-Up Visit Mr. Lawson has continued his pantoprazole BID. He has been tolerating the pantoprazole without difficulty. Denies dysphagia, odynophagia, abdominal symptoms.  6/21/2021: Helicobacter pylori serologies negative. Intrinsic factor antibody positive. Anti-parietal cell antibody positive.    7/12/2021: Gastroenterology Follow-Up Visit  Mr. Lawson notes that over the past week, he has experienced rectal discomfort as well as intermittnet bright red blood per rectum. He denies abdominal pain. He did notice a decrease in the amount of fluids he took in over the past 30 days. Denied straining to have a bowel movement. He has not had any further episodes of bright red blood per rectum over the last 72 hours.

## 2021-07-19 ENCOUNTER — TELEPHONE ENCOUNTER (OUTPATIENT)
Dept: URBAN - NONMETROPOLITAN AREA CLINIC 2 | Facility: CLINIC | Age: 71
End: 2021-07-19

## 2021-07-20 ENCOUNTER — TELEPHONE ENCOUNTER (OUTPATIENT)
Dept: URBAN - METROPOLITAN AREA CLINIC 23 | Facility: CLINIC | Age: 71
End: 2021-07-20

## 2021-07-20 RX ORDER — POLYETHYLENE GLYCOL 3350, SODIUM SULFATE ANHYDROUS, SODIUM BICARBONATE, SODIUM CHLORIDE, POTASSIUM CHLORIDE 236; 22.74; 6.74; 5.86; 2.97 G/4L; G/4L; G/4L; G/4L; G/4L
AS DIRECTED POWDER, FOR SOLUTION ORAL ONCE
Qty: 1 GALLON | Refills: 0 | OUTPATIENT
Start: 2021-07-20 | End: 2021-07-21

## 2021-07-20 RX ORDER — PANTOPRAZOLE SODIUM 40 MG/1
1 TABLET TABLET, DELAYED RELEASE ORAL EVERY 12 HOURS
Qty: 180 TABLET | Refills: 3 | Status: ACTIVE | COMMUNITY
Start: 2021-03-19

## 2021-07-21 PROBLEM — 68496003 COLONIC POLYP: Status: ACTIVE | Noted: 2020-09-15

## 2021-08-31 ENCOUNTER — OFFICE VISIT (OUTPATIENT)
Dept: URBAN - NONMETROPOLITAN AREA CLINIC 2 | Facility: CLINIC | Age: 71
End: 2021-08-31

## 2021-09-21 ENCOUNTER — OFFICE VISIT (OUTPATIENT)
Dept: URBAN - METROPOLITAN AREA MEDICAL CENTER 1 | Facility: MEDICAL CENTER | Age: 71
End: 2021-09-21

## 2021-10-18 ENCOUNTER — OFFICE VISIT (OUTPATIENT)
Dept: URBAN - NONMETROPOLITAN AREA CLINIC 2 | Facility: CLINIC | Age: 71
End: 2021-10-18

## 2021-12-01 ENCOUNTER — TELEPHONE ENCOUNTER (OUTPATIENT)
Dept: URBAN - NONMETROPOLITAN AREA CLINIC 2 | Facility: CLINIC | Age: 71
End: 2021-12-01

## 2021-12-08 ENCOUNTER — OFFICE VISIT (OUTPATIENT)
Dept: URBAN - NONMETROPOLITAN AREA CLINIC 13 | Facility: CLINIC | Age: 71
End: 2021-12-08

## 2021-12-08 RX ORDER — PANTOPRAZOLE SODIUM 40 MG/1
1 TABLET TABLET, DELAYED RELEASE ORAL EVERY 12 HOURS
Qty: 180 TABLET | Refills: 3 | Status: ACTIVE | COMMUNITY
Start: 2021-03-19

## 2021-12-08 NOTE — HPI-TODAY'S VISIT:
9/15/2020: Initial Gastroenterology Clinic Visit       Mr. Lawson is a 70 year old gentleman with past medical history of CAD s/p stents to proximal and mid LAD (1998), mid-distal LAD (2003), apical thrombus currently on coumadin, paroxysmal atrial fibrillation on coumadin, history of AICD, heart failure with EF of 50-55%, history of colonic polyps, and history of iron deficiency anemia who presents for evaluation of iron deficiency anemia.  Mr. Lawson states he has had a two year history of iron deficiency anemia. He was previously evaluated by Dr. Delgado regarding his iron deficiency anemia. As part of the evaluation, Mr. Lawson underwent a colonoscopy which showed sigmoid diverticulosis. At the time there was discussion about undergoing an upper endoscopy and video capsule endoscopy, however Mr. Lawson did not pursue the upper endoscopy or video capsule endoscopy.   He is currently on coumadin. He is not on non-steroidal anti-inflammatory medications or aspirin. He is not currently on a proton pump inhibitor.   He notes his stool is dark brown. He denies melena or hematochezia. He denies weight loss. He denies reflux like symptoms.   He is currently being evaluated at [facility] [place] for his iron deficiency anemia.   Prior Endoscopic Evaluation: 2019: Colonoscopy Sigmoid diverticulosis. No polyps identified.   Prior Laboratory Evaluation:  7/28/2020:  Hemoglobin 11.2, hematocrit 34.0.  Interval Events:  - 9/15/2020: WBC normal, hemoglobin 11.5, hematocrit 35.8, platelets 258. Serum iron 49, TIBC 359, percent transferrin 14%. Chemistry panel and LFTs normal. - After results were relayed to Mr. Lawson, he indicated he wanted to check his repeat blood counts at his next clinic visit before deciding on undergoingan EGD + VCE.  10/13/2020: WBC 6.3 (differential normal), hemoglobin 12.3, hematocrit 39.2, platelets 224. Serum iron 44, TIBC 359, percent saturation 12%.  11/12/2020: Complete blood count with differential normal. Serum iron 62, TIBC 360, percent saturation 17%.  2/1/2021: Gastroenterology Clinic Follow-Up He is not on iron supplementation currently. Denies abdominal pain. He is interested in pursuing upper endoscopy.  3/16/2021: EGD FINDINGS / IMPRESSION: - The examined esophagus was normal. - There were three tongues of salmon colored mucosa at the gastroesophageal junction. The maximum longitudinal extent was 1 cm. Biopsies obtained with cold biopsy forceps to evaluate for Medina's esophagus. Estimated blood loss was minimal.  - The Z-line was present at 38 cm. - Small hiatal hernia. - There was erythematous mucosa at the gastric antrum with small superficial erosions. Biopsies obtained with cold biopsy forceps to rule out Helicobacter pylori. Estimated blood loss was minimal. - The cardia and fundus were normal on retroflexion. - The duodenum was normal. Biopsies obtained with cold biopsy forceps to rule out Celiac disease. Estimated blood loss was minimal.  3/16/2021: Pathology from EGD Final Diagnosis A.  SMALL INTESTINE, DUODENUM, BIOPSY:              -NO SIGNIFICANT HISTOPATHOLOGIC CHANGE. B.  STOMACH, ANTRUM, BIOPSY:              -MODERATE CHRONIC GASTRITIS WITH REACTIVE/REGENERATIVE EPITHELIAL CHANGES.              -NO HELICOBACTER PYLORI ORGANISMS IDENTIFIED..              -INTESTINAL METAPLASIA PRESENT.              -NO DYSPLASIA OR CARCINOMA IDENTIFIED. C.  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              -NO DYSPLASIA OR CARCINOMA IDENTIFIED.  5/18/2021: EGD There were three tongues of salmon colored esophageal mucosa suspicious for short segments Barretts esophagus at the gastroesophageal junction. The maximum longitudinal extent of these mucosal changes was 1 cm in length.  A small hiatal hernia was present.  Diffuse erythematous mucosa without bleeding was found in the entire examined stomach. Given history of intestinal metaplasia in the antrum during EGD 3/2021, biopsies were taken with a cold forceps for histology of the antrum, incisura, body, and cardia for gastric mapping.  The cardia and gastric fundus were normal on retroflexion. The examined duodenum was normal.   5/18/2021: Pathology from EGD A.  STOMACH, ANTRUM, BIOPSY:  -MODERATE CHRONIC GASTRITIS WITH REACTIVE EPITHELIAL CHANGES -NO HELICOBACTER PYLORI ORGANISMS IDENTIFIED. -NO INTESTINAL METAPLASIA, DYSPLASIA, OR CARCINOMA IDENTIFIED. B.  STOMACH, FUNDUS, BIOPSY:  -MODERATE CHRONIC GASTRITIS WITH REACTIVE EPITHELIAL CHANGES -NO HELICOBACTER PYLORI ORGANISMS IDENTIFIED.. -NO INTESTINAL METAPLASIA, DYSPLASIA, OR CARCINOMA IDENTIFIED. C.  STOMACH, CARDIA, BIOPSY: -MODERATE CHRONIC GASTRITIS, FOCAL ACUTE INFLAMMATION AND REACTIVE EPITHELIAL CHANGES -NO HELICOBACTER PYLORI ORGANISMS IDENTIFIED. -INTESTINAL METAPLASIA PRESENT. -NO DYSPLASIA OR CARCINOMA IDENTIFIED. D.  STOMACH, ANTRUM, BIOPSY:  -MODERATE CHRONIC GASTRITIS, FOCAL ACUTE INFLAMMATION AND REACTIVE EPITHELIAL CHANGES -NO HELICOBACTER PYLORI ORGANISMS IDENTIFIED. -INTESTINAL METAPLASIA PRESENT. -NO DYSPLASIA OR CARCINOMA IDENTIFIED. E.  ESOPHAGUS, BIOPSY -SQUAMOCOLUMNAR MUCOSA WITH MILD INCREASE IN CHRONIC INFLAMMATION. -NO GOBLET CELL METAPLASIA IDENTIFIED. 6/11/2021: CBC, chemistry panel, LFTs, CK normal.   6/21/2021: Gastroenterology Follow-Up Visit Mr. Lawson has continued his pantoprazole BID. He has been tolerating the pantoprazole without difficulty. Denies dysphagia, odynophagia, abdominal symptoms.  6/21/2021: Helicobacter pylori serologies negative. Intrinsic factor antibody positive. Anti-parietal cell antibody positive.    7/12/2021: Gastroenterology Follow-Up Visit  Mr. Lawson notes that over the past week, he has experienced rectal discomfort as well as intermittnet bright red blood per rectum. He denies abdominal pain. He did notice a decrease in the amount of fluids he took in over the past 30 days. Denied straining to have a bowel movement. He has not had any further episodes of bright red blood per rectum over the last 72 hours.  Plan: - Echo given EF 40%. - Discuss CT scan.

## 2021-12-20 ENCOUNTER — LAB OUTSIDE AN ENCOUNTER (OUTPATIENT)
Dept: URBAN - NONMETROPOLITAN AREA CLINIC 2 | Facility: CLINIC | Age: 71
End: 2021-12-20

## 2021-12-20 ENCOUNTER — OFFICE VISIT (OUTPATIENT)
Dept: URBAN - NONMETROPOLITAN AREA CLINIC 2 | Facility: CLINIC | Age: 71
End: 2021-12-20
Payer: COMMERCIAL

## 2021-12-20 VITALS
SYSTOLIC BLOOD PRESSURE: 133 MMHG | HEART RATE: 72 BPM | WEIGHT: 228 LBS | TEMPERATURE: 97 F | HEIGHT: 71 IN | DIASTOLIC BLOOD PRESSURE: 85 MMHG | BODY MASS INDEX: 31.92 KG/M2

## 2021-12-20 DIAGNOSIS — D50.9 IRON DEFICIENCY ANEMIA: ICD-10-CM

## 2021-12-20 DIAGNOSIS — I25.9 CORONARY ARTERY DISEASE: ICD-10-CM

## 2021-12-20 DIAGNOSIS — D51.0 PERNICIOUS ANEMIA: ICD-10-CM

## 2021-12-20 DIAGNOSIS — I48.91 ATRIAL FIBRILLATION: ICD-10-CM

## 2021-12-20 DIAGNOSIS — K31.89 INTESTINAL METAPLASIA OF GASTRIC MUCOSA: ICD-10-CM

## 2021-12-20 DIAGNOSIS — K92.1 HEMATOCHEZIA: ICD-10-CM

## 2021-12-20 DIAGNOSIS — K22.70 BARRETT'S ESOPHAGUS WITHOUT DYSPLASIA: ICD-10-CM

## 2021-12-20 DIAGNOSIS — Z86.010 PERSONAL HISTORY OF COLONIC POLYPS: ICD-10-CM

## 2021-12-20 DIAGNOSIS — R10.84 GENERALIZED ABDOMINAL PAIN: ICD-10-CM

## 2021-12-20 DIAGNOSIS — K57.90 DIVERTICULOSIS: ICD-10-CM

## 2021-12-20 PROCEDURE — 99214 OFFICE O/P EST MOD 30 MIN: CPT | Performed by: INTERNAL MEDICINE

## 2021-12-20 RX ORDER — PANTOPRAZOLE SODIUM 40 MG/1
1 TABLET TABLET, DELAYED RELEASE ORAL EVERY 12 HOURS
Qty: 180 TABLET | Refills: 3 | Status: ACTIVE | COMMUNITY
Start: 2021-03-19

## 2021-12-20 NOTE — HPI-TODAY'S VISIT:
9/15/2020: Initial Gastroenterology Clinic Visit        Mr. Lawson is a 70 year old gentleman with past medical history of CAD s/p stents to proximal and mid LAD (1998), mid-distal LAD (2003), apical thrombus currently on coumadin, paroxysmal atrial fibrillation on coumadin, history of AICD, heart failure with EF of 50-55%, history of colonic polyps, and history of iron deficiency anemia who presents for evaluation of iron deficiency anemia.  Mr. Lawson states he has had a two year history of iron deficiency anemia. He was previously evaluated by Dr. Delgado regarding his iron deficiency anemia. As part of the evaluation, Mr. Lawson underwent a colonoscopy which showed sigmoid diverticulosis. At the time there was discussion about undergoing an upper endoscopy and video capsule endoscopy, however Mr. Lawson did not pursue the upper endoscopy or video capsule endoscopy.   He is currently on coumadin. He is not on non-steroidal anti-inflammatory medications or aspirin. He is not currently on a proton pump inhibitor.   He notes his stool is dark brown. He denies melena or hematochezia. He denies weight loss. He denies reflux like symptoms.   He is currently being evaluated at [facility] and [facility] [place] for his iron deficiency anemia.   Prior Endoscopic Evaluation: 2019: Colonoscopy Sigmoid diverticulosis. No polyps identified.   Prior Laboratory Evaluation:  7/28/2020:  Hemoglobin 11.2, hematocrit 34.0.  Interval Events:  - 9/15/2020: WBC normal, hemoglobin 11.5, hematocrit 35.8, platelets 258. Serum iron 49, TIBC 359, percent transferrin 14%. Chemistry panel and LFTs normal. - After results were relayed to Mr. Lawson, he indicated he wanted to check his repeat blood counts at his next clinic visit before deciding on undergoingan EGD + VCE.  10/13/2020: WBC 6.3 (differential normal), hemoglobin 12.3, hematocrit 39.2, platelets 224. Serum iron 44, TIBC 359, percent saturation 12%.  11/12/2020: Complete blood count with differential normal. Serum iron 62, TIBC 360, percent saturation 17%.  2/1/2021: Gastroenterology Clinic Follow-Up He is not on iron supplementation currently. Denies abdominal pain. He is interested in pursuing upper endoscopy.  3/16/2021: EGD FINDINGS / IMPRESSION: - The examined esophagus was normal. - There were three tongues of salmon colored mucosa at the gastroesophageal junction. The maximum longitudinal extent was 1 cm. Biopsies obtained with cold biopsy forceps to evaluate for Medina's esophagus. Estimated blood loss was minimal.  - The Z-line was present at 38 cm. - Small hiatal hernia. - There was erythematous mucosa at the gastric antrum with small superficial erosions. Biopsies obtained with cold biopsy forceps to rule out Helicobacter pylori. Estimated blood loss was minimal. - The cardia and fundus were normal on retroflexion. - The duodenum was normal. Biopsies obtained with cold biopsy forceps to rule out Celiac disease. Estimated blood loss was minimal.  3/16/2021: Pathology from EGD Final Diagnosis A.  SMALL INTESTINE, DUODENUM, BIOPSY:              -NO SIGNIFICANT HISTOPATHOLOGIC CHANGE. B.  STOMACH, ANTRUM, BIOPSY:              -MODERATE CHRONIC GASTRITIS WITH REACTIVE/REGENERATIVE EPITHELIAL CHANGES.              -NO HELICOBACTER PYLORI ORGANISMS IDENTIFIED..              -INTESTINAL METAPLASIA PRESENT.              -NO DYSPLASIA OR CARCINOMA IDENTIFIED. C.  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              -NO DYSPLASIA OR CARCINOMA IDENTIFIED.  5/18/2021: EGD There were three tongues of salmon colored esophageal mucosa suspicious for short segments Barretts esophagus at the gastroesophageal junction. The maximum longitudinal extent of these mucosal changes was 1 cm in length.  A small hiatal hernia was present.  Diffuse erythematous mucosa without bleeding was found in the entire examined stomach. Given history of intestinal metaplasia in the antrum during EGD 3/2021, biopsies were taken with a cold forceps for histology of the antrum, incisura, body, and cardia for gastric mapping.  The cardia and gastric fundus were normal on retroflexion. The examined duodenum was normal.   5/18/2021: Pathology from EGD A.  STOMACH, ANTRUM, BIOPSY:  -MODERATE CHRONIC GASTRITIS WITH REACTIVE EPITHELIAL CHANGES -NO HELICOBACTER PYLORI ORGANISMS IDENTIFIED. -NO INTESTINAL METAPLASIA, DYSPLASIA, OR CARCINOMA IDENTIFIED. B.  STOMACH, FUNDUS, BIOPSY:  -MODERATE CHRONIC GASTRITIS WITH REACTIVE EPITHELIAL CHANGES -NO HELICOBACTER PYLORI ORGANISMS IDENTIFIED.. -NO INTESTINAL METAPLASIA, DYSPLASIA, OR CARCINOMA IDENTIFIED. C.  STOMACH, CARDIA, BIOPSY: -MODERATE CHRONIC GASTRITIS, FOCAL ACUTE INFLAMMATION AND REACTIVE EPITHELIAL CHANGES -NO HELICOBACTER PYLORI ORGANISMS IDENTIFIED. -INTESTINAL METAPLASIA PRESENT. -NO DYSPLASIA OR CARCINOMA IDENTIFIED. D.  STOMACH, ANTRUM, BIOPSY:  -MODERATE CHRONIC GASTRITIS, FOCAL ACUTE INFLAMMATION AND REACTIVE EPITHELIAL CHANGES -NO HELICOBACTER PYLORI ORGANISMS IDENTIFIED. -INTESTINAL METAPLASIA PRESENT. -NO DYSPLASIA OR CARCINOMA IDENTIFIED. E.  ESOPHAGUS, BIOPSY -SQUAMOCOLUMNAR MUCOSA WITH MILD INCREASE IN CHRONIC INFLAMMATION. -NO GOBLET CELL METAPLASIA IDENTIFIED. 6/11/2021: CBC, chemistry panel, LFTs, CK normal.   6/21/2021: Gastroenterology Follow-Up Visit Mr. Lawson has continued his pantoprazole BID. He has been tolerating the pantoprazole without difficulty. Denies dysphagia, odynophagia, abdominal symptoms.  6/21/2021: Helicobacter pylori serologies negative. Intrinsic factor antibody positive. Anti-parietal cell antibody positive.    7/12/2021: Gastroenterology Follow-Up Visit  Mr. Lawson notes that over the past week, he has experienced rectal discomfort as well as intermittnet bright red blood per rectum. He denies abdominal pain. He did notice a decrease in the amount of fluids he took in over the past 30 days. Denied straining to have a bowel movement. He has not had any further episodes of bright red blood per rectum over the last 72 hours.  12/20/2021: Gastroenterology Follow-Up Visit Mr. Lawson wsa unable to obtan the colonoscopy as previously recommended. He does not have any bright red blood per rectum. He did notice the development of epigastric discomfort beginning on November 23, 2021. He initially thought the symptoms were similar to his prior heart attack and was evaluated by EMS who determiend he did not experience a cardiac event. His symptoms improved with Rolaids. He does acknowledge that his abdominal dicomfort occurred after taking an excess amount of vitamin D supplementation. His abdominal symptoms have since resolved.

## 2022-02-11 ENCOUNTER — OFFICE VISIT (OUTPATIENT)
Dept: URBAN - NONMETROPOLITAN AREA CLINIC 2 | Facility: CLINIC | Age: 72
End: 2022-02-11

## 2022-03-11 ENCOUNTER — TELEPHONE ENCOUNTER (OUTPATIENT)
Dept: URBAN - NONMETROPOLITAN AREA CLINIC 2 | Facility: CLINIC | Age: 72
End: 2022-03-11

## 2022-03-21 ENCOUNTER — DASHBOARD ENCOUNTERS (OUTPATIENT)
Age: 72
End: 2022-03-21

## 2022-03-21 ENCOUNTER — OFFICE VISIT (OUTPATIENT)
Dept: URBAN - NONMETROPOLITAN AREA CLINIC 2 | Facility: CLINIC | Age: 72
End: 2022-03-21
Payer: COMMERCIAL

## 2022-03-21 VITALS
DIASTOLIC BLOOD PRESSURE: 76 MMHG | WEIGHT: 228 LBS | HEIGHT: 71 IN | BODY MASS INDEX: 31.92 KG/M2 | SYSTOLIC BLOOD PRESSURE: 110 MMHG | HEART RATE: 91 BPM

## 2022-03-21 DIAGNOSIS — I25.9 CORONARY ARTERY DISEASE: ICD-10-CM

## 2022-03-21 DIAGNOSIS — D51.0 PERNICIOUS ANEMIA: ICD-10-CM

## 2022-03-21 DIAGNOSIS — Z86.010 PERSONAL HISTORY OF COLONIC POLYPS: ICD-10-CM

## 2022-03-21 DIAGNOSIS — D50.9 IRON DEFICIENCY ANEMIA: ICD-10-CM

## 2022-03-21 DIAGNOSIS — K57.90 DIVERTICULOSIS: ICD-10-CM

## 2022-03-21 DIAGNOSIS — R10.84 GENERALIZED ABDOMINAL PAIN: ICD-10-CM

## 2022-03-21 DIAGNOSIS — K22.70 BARRETT'S ESOPHAGUS WITHOUT DYSPLASIA: ICD-10-CM

## 2022-03-21 DIAGNOSIS — K31.89 INTESTINAL METAPLASIA OF GASTRIC MUCOSA: ICD-10-CM

## 2022-03-21 DIAGNOSIS — I48.91 ATRIAL FIBRILLATION: ICD-10-CM

## 2022-03-21 DIAGNOSIS — K92.1 HEMATOCHEZIA: ICD-10-CM

## 2022-03-21 PROBLEM — 84027009: Status: ACTIVE | Noted: 2021-06-21

## 2022-03-21 PROBLEM — 398050005 DIVERTICULAR DISEASE OF COLON: Status: ACTIVE | Noted: 2020-09-17

## 2022-03-21 PROBLEM — 49436004 ATRIAL FIBRILLATION: Status: ACTIVE | Noted: 2020-09-17

## 2022-03-21 PROBLEM — 72519002: Status: ACTIVE | Noted: 2021-03-19

## 2022-03-21 PROBLEM — 102614006: Status: ACTIVE | Noted: 2021-12-20

## 2022-03-21 PROBLEM — 428283002: Status: ACTIVE | Noted: 2021-07-04

## 2022-03-21 PROBLEM — 87522002 IRON DEFICIENCY ANEMIA: Status: ACTIVE | Noted: 2020-09-15

## 2022-03-21 PROBLEM — 53741008 CORONARY ARTERY DISEASE: Status: ACTIVE | Noted: 2020-09-17

## 2022-03-21 PROCEDURE — 99214 OFFICE O/P EST MOD 30 MIN: CPT | Performed by: INTERNAL MEDICINE

## 2022-03-21 RX ORDER — PANTOPRAZOLE SODIUM 40 MG/1
1 TABLET TABLET, DELAYED RELEASE ORAL EVERY 12 HOURS
Qty: 180 TABLET | Refills: 3 | Status: ACTIVE | COMMUNITY
Start: 2021-03-19

## 2022-03-21 NOTE — HPI-TODAY'S VISIT:
9/15/2020: Initial Gastroenterology Clinic Visit            Mr. Lawson is a 70 year old gentleman with past medical history of CAD s/p stents to proximal and mid LAD (1998), mid-distal LAD (2003), apical thrombus currently on coumadin, paroxysmal atrial fibrillation on coumadin, history of AICD, heart failure with EF of 50-55%, history of colonic polyps, and history of iron deficiency anemia who presents for evaluation of iron deficiency anemia.  Mr. Lawson states he has had a two year history of iron deficiency anemia. He was previously evaluated by Dr. Delgado regarding his iron deficiency anemia. As part of the evaluation, Mr. Lawson underwent a colonoscopy which showed sigmoid diverticulosis. At the time there was discussion about undergoing an upper endoscopy and video capsule endoscopy, however Mr. Lawson did not pursue the upper endoscopy or video capsule endoscopy.   He is currently on coumadin. He is not on non-steroidal anti-inflammatory medications or aspirin. He is not currently on a proton pump inhibitor.   He notes his stool is dark brown. He denies melena or hematochezia. He denies weight loss. He denies reflux like symptoms.   He is currently being evaluated at [facility] and [facility] [place] for his iron deficiency anemia.   Prior Endoscopic Evaluation: 2019: Colonoscopy Sigmoid diverticulosis. No polyps identified.   Prior Laboratory Evaluation:  7/28/2020:  Hemoglobin 11.2, hematocrit 34.0.  Interval Events:  - 9/15/2020: WBC normal, hemoglobin 11.5, hematocrit 35.8, platelets 258. Serum iron 49, TIBC 359, percent transferrin 14%. Chemistry panel and LFTs normal. - After results were relayed to Mr. Lawson, he indicated he wanted to check his repeat blood counts at his next clinic visit before deciding on undergoingan EGD + VCE.  10/13/2020: WBC 6.3 (differential normal), hemoglobin 12.3, hematocrit 39.2, platelets 224. Serum iron 44, TIBC 359, percent saturation 12%.  11/12/2020: Complete blood count with differential normal. Serum iron 62, TIBC 360, percent saturation 17%.  2/1/2021: Gastroenterology Clinic Follow-Up He is not on iron supplementation currently. Denies abdominal pain. He is interested in pursuing upper endoscopy.  3/16/2021: EGD FINDINGS / IMPRESSION: - The examined esophagus was normal. - There were three tongues of salmon colored mucosa at the gastroesophageal junction. The maximum longitudinal extent was 1 cm. Biopsies obtained with cold biopsy forceps to evaluate for Medina's esophagus. Estimated blood loss was minimal.  - The Z-line was present at 38 cm. - Small hiatal hernia. - There was erythematous mucosa at the gastric antrum with small superficial erosions. Biopsies obtained with cold biopsy forceps to rule out Helicobacter pylori. Estimated blood loss was minimal. - The cardia and fundus were normal on retroflexion. - The duodenum was normal. Biopsies obtained with cold biopsy forceps to rule out Celiac disease. Estimated blood loss was minimal.  3/16/2021: Pathology from EGD Final Diagnosis A.  SMALL INTESTINE, DUODENUM, BIOPSY:              -NO SIGNIFICANT HISTOPATHOLOGIC CHANGE. B.  STOMACH, ANTRUM, BIOPSY:              -MODERATE CHRONIC GASTRITIS WITH REACTIVE/REGENERATIVE EPITHELIAL CHANGES.              -NO HELICOBACTER PYLORI ORGANISMS IDENTIFIED..              -INTESTINAL METAPLASIA PRESENT.              -NO DYSPLASIA OR CARCINOMA IDENTIFIED. C.  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              -NO DYSPLASIA OR CARCINOMA IDENTIFIED.  5/18/2021: EGD There were three tongues of salmon colored esophageal mucosa suspicious for short segments Barretts esophagus at the gastroesophageal junction. The maximum longitudinal extent of these mucosal changes was 1 cm in length.  A small hiatal hernia was present.  Diffuse erythematous mucosa without bleeding was found in the entire examined stomach. Given history of intestinal metaplasia in the antrum during EGD 3/2021, biopsies were taken with a cold forceps for histology of the antrum, incisura, body, and cardia for gastric mapping.  The cardia and gastric fundus were normal on retroflexion. The examined duodenum was normal.   5/18/2021: Pathology from EGD A.  STOMACH, ANTRUM, BIOPSY:  -MODERATE CHRONIC GASTRITIS WITH REACTIVE EPITHELIAL CHANGES -NO HELICOBACTER PYLORI ORGANISMS IDENTIFIED. -NO INTESTINAL METAPLASIA, DYSPLASIA, OR CARCINOMA IDENTIFIED. B.  STOMACH, FUNDUS, BIOPSY:  -MODERATE CHRONIC GASTRITIS WITH REACTIVE EPITHELIAL CHANGES -NO HELICOBACTER PYLORI ORGANISMS IDENTIFIED.. -NO INTESTINAL METAPLASIA, DYSPLASIA, OR CARCINOMA IDENTIFIED. C.  STOMACH, CARDIA, BIOPSY: -MODERATE CHRONIC GASTRITIS, FOCAL ACUTE INFLAMMATION AND REACTIVE EPITHELIAL CHANGES -NO HELICOBACTER PYLORI ORGANISMS IDENTIFIED. -INTESTINAL METAPLASIA PRESENT. -NO DYSPLASIA OR CARCINOMA IDENTIFIED. D.  STOMACH, ANTRUM, BIOPSY:  -MODERATE CHRONIC GASTRITIS, FOCAL ACUTE INFLAMMATION AND REACTIVE EPITHELIAL CHANGES -NO HELICOBACTER PYLORI ORGANISMS IDENTIFIED. -INTESTINAL METAPLASIA PRESENT. -NO DYSPLASIA OR CARCINOMA IDENTIFIED. E.  ESOPHAGUS, BIOPSY -SQUAMOCOLUMNAR MUCOSA WITH MILD INCREASE IN CHRONIC INFLAMMATION. -NO GOBLET CELL METAPLASIA IDENTIFIED. 6/11/2021: CBC, chemistry panel, LFTs, CK normal.   6/21/2021: Gastroenterology Follow-Up Visit Mr. Lawson has continued his pantoprazole BID. He has been tolerating the pantoprazole without difficulty. Denies dysphagia, odynophagia, abdominal symptoms.  6/21/2021: Helicobacter pylori serologies negative. Intrinsic factor antibody positive. Anti-parietal cell antibody positive.    7/12/2021: Gastroenterology Follow-Up Visit  Mr. Lawson notes that over the past week, he has experienced rectal discomfort as well as intermittnet bright red blood per rectum. He denies abdominal pain. He did notice a decrease in the amount of fluids he took in over the past 30 days. Denied straining to have a bowel movement. He has not had any further episodes of bright red blood per rectum over the last 72 hours.  12/20/2021: Gastroenterology Follow-Up Visit Mr. Lawson was unable to obtan the colonoscopy as previously recommended. He does not have any bright red blood per rectum. He did notice the development of epigastric discomfort beginning on November 23, 2021. He initially thought the symptoms were similar to his prior heart attack and was evaluated by EMS who determiend he did not experience a cardiac event. His symptoms improved with Rolaids. He does acknowledge that his abdominal dicomfort occurred after taking an excess amount of vitamin D supplementation. His abdominal symptoms have since resolved.  3/3/2022: CT Abdomen and Pelvis with Contrast  1.	Degenerative spine changes. 2.	No bowel obstruction or evidence of acute inflammatory change. Diverticulosis. No diverticulitis. 3.	Ventral midline hernia containing fat. This is a periumbilical hernia.      3/21/2022: Gastroenterology Follow-Up Visit Mr. Lawson notes that he did not schedule the colonoscopy as previously discussed. His abdominal symptoms have completely resolved. He denies melena, hematochezia, abdominal pain. He does note that he will be undergoing a left hip surgery in the coming months.

## 2022-03-23 ENCOUNTER — TELEPHONE ENCOUNTER (OUTPATIENT)
Dept: URBAN - NONMETROPOLITAN AREA CLINIC 2 | Facility: CLINIC | Age: 72
End: 2022-03-23

## 2022-06-27 ENCOUNTER — TELEPHONE ENCOUNTER (OUTPATIENT)
Dept: URBAN - NONMETROPOLITAN AREA CLINIC 2 | Facility: CLINIC | Age: 72
End: 2022-06-27

## 2022-06-27 PROBLEM — 302914006: Status: ACTIVE | Noted: 2021-03-19

## 2022-06-27 RX ORDER — PANTOPRAZOLE SODIUM 40 MG/1
1 TABLET TABLET, DELAYED RELEASE ORAL EVERY 12 HOURS
Qty: 180 TABLET | Refills: 1
Start: 2021-03-19

## 2022-07-01 ENCOUNTER — TELEPHONE ENCOUNTER (OUTPATIENT)
Dept: URBAN - NONMETROPOLITAN AREA CLINIC 2 | Facility: CLINIC | Age: 72
End: 2022-07-01

## 2022-07-06 ENCOUNTER — TELEPHONE ENCOUNTER (OUTPATIENT)
Dept: URBAN - NONMETROPOLITAN AREA CLINIC 2 | Facility: CLINIC | Age: 72
End: 2022-07-06

## 2022-08-22 ENCOUNTER — TELEPHONE ENCOUNTER (OUTPATIENT)
Dept: URBAN - NONMETROPOLITAN AREA CLINIC 2 | Facility: CLINIC | Age: 72
End: 2022-08-22

## 2022-12-21 PROBLEM — 449341000124102: Status: ACTIVE | Noted: 2021-07-12

## 2023-01-03 ENCOUNTER — TELEPHONE ENCOUNTER (OUTPATIENT)
Dept: URBAN - NONMETROPOLITAN AREA CLINIC 2 | Facility: CLINIC | Age: 73
End: 2023-01-03

## 2023-01-23 ENCOUNTER — OFFICE VISIT (OUTPATIENT)
Dept: URBAN - METROPOLITAN AREA MEDICAL CENTER 1 | Facility: MEDICAL CENTER | Age: 73
End: 2023-01-23
